# Patient Record
Sex: FEMALE | Race: WHITE | Employment: FULL TIME | URBAN - NONMETROPOLITAN AREA
[De-identification: names, ages, dates, MRNs, and addresses within clinical notes are randomized per-mention and may not be internally consistent; named-entity substitution may affect disease eponyms.]

---

## 2023-09-26 NOTE — PROGRESS NOTES
[facility] initial consultation note    Emma Neff                                                Chief complaint: Emma Neff is a 60 y. o.oldfemale came for further evaluation regarding her sleep apnea with referral from LILIANA Rod - CNP. Cachil DeHe:    Sleep/Wake schedule:  Usual time to go to bed during the work/regular day of week: 9 PM  Usual time to wake up during the work//regular day of week : 5:00Am  Over the weekends her sleep schedule:  [x]phase delayed. She feels the same on the weekends despite sleeping long time. She usually falls a sleep in less than: ~30minutes  She takes naps: Yes. Number of naps per week:  2  During each nap she spends a total of:  ~20 minutes  The naps were reported as refreshing: NO    Sleep Hygiene:    Is the temperature and evironment in her bed room is acceptable to her: Yes. She watches Television in her bed room: Yes. She read books, study, pay bills etc in the bed: No.  Frequency She wake up during night/sleep: 2 to 3  Majority of nocturnal awakenings are for urination: Yes. Difficulty in falling back to sleep after nocturnal awakenings: No    Do you drink coffee: No.   Do you drink caffeinated beverages i.e sodas: No.    Do you drink tea: She drinks 1 glass of decaffeinated tea once a week  Do you drink alcoholic beverages: No.      History of recreational drug use: No.     Social History     Tobacco Use    Smoking status: Never    Smokeless tobacco: Never   Substance Use Topics    Alcohol use: Not Currently    Drug use: Never       Sleep apnea symptoms:    Shewas diagnosed with sleep apnea in the past.  Please see diagnostic data section for details. History of headaches in the morning:No.  History of dry mouth in the morning: Yes.   History of palpitations during night time/nocturnal awakenings: No  History of sweating during night time/nocturnal awakenings:

## 2023-10-26 ENCOUNTER — OFFICE VISIT (OUTPATIENT)
Dept: PULMONOLOGY | Age: 58
End: 2023-10-26
Payer: COMMERCIAL

## 2023-10-26 VITALS
WEIGHT: 293 LBS | BODY MASS INDEX: 41.02 KG/M2 | TEMPERATURE: 97.5 F | DIASTOLIC BLOOD PRESSURE: 70 MMHG | HEART RATE: 53 BPM | SYSTOLIC BLOOD PRESSURE: 118 MMHG | HEIGHT: 71 IN | OXYGEN SATURATION: 98 %

## 2023-10-26 DIAGNOSIS — I48.20 CHRONIC A-FIB (HCC): ICD-10-CM

## 2023-10-26 DIAGNOSIS — G47.10 HYPERSOMNIA: ICD-10-CM

## 2023-10-26 DIAGNOSIS — G47.33 OSA (OBSTRUCTIVE SLEEP APNEA): Primary | ICD-10-CM

## 2023-10-26 PROCEDURE — 99204 OFFICE O/P NEW MOD 45 MIN: CPT | Performed by: INTERNAL MEDICINE

## 2023-10-26 RX ORDER — FUROSEMIDE 20 MG/1
20 TABLET ORAL PRN
COMMUNITY

## 2023-10-26 RX ORDER — LOSARTAN POTASSIUM 25 MG/1
25 TABLET ORAL DAILY
COMMUNITY

## 2023-10-26 RX ORDER — METOPROLOL SUCCINATE 50 MG/1
50 TABLET, EXTENDED RELEASE ORAL DAILY
COMMUNITY
End: 2023-10-26

## 2023-10-26 RX ORDER — LIOTHYRONINE SODIUM 5 UG/1
5 TABLET ORAL DAILY
COMMUNITY

## 2023-10-26 RX ORDER — FLECAINIDE ACETATE 50 MG/1
50 TABLET ORAL 2 TIMES DAILY
COMMUNITY

## 2023-10-26 RX ORDER — LEVOTHYROXINE SODIUM 0.1 MG/1
100 TABLET ORAL DAILY
COMMUNITY

## 2023-10-26 RX ORDER — MAGNESIUM 30 MG
250 TABLET ORAL DAILY
COMMUNITY

## 2023-10-26 NOTE — PATIENT INSTRUCTIONS
Recommendations/Plan:  -I had a discussion with patient regarding avialable treatment options for her sleep disorder breathing including but not limited to CPAP titration in the sleep lab Vs.Dental appliance placement with referral to a local dentist Vs other available surgical options including Uvulopalatopharyngoplasty, maxillomandibular ostomy, Inspire device placement and tracheostomy as last option. At the end of discussion, she decided to go for the CPAP titration. She is currently waiting for in lab CPAP titration study at [facility] on 8 November 2023. She was advised to keep her scheduled appointment without fail.  -she advised to keep good compliance with recommended positive airway pressure therapy to get optimal results and clinical improvement.  -Follow with my clinic in 6 to 8 weeks after starting on positive airway pressure therapy  for clinical reevaluation with review of download. Sheadvised to bring her CPAP/BiPAP/AutoSV machine or smart memory card from machine for download review.  -She was advised to call [employer] regarding supplies if needed.  -She was advised to continue to practice good sleep hygiene techniques.   -She was advised to loose weight by controlling diet and doing exercise once cleared by her cardiologist.  -She was instructed to not to drive any motor vehicles or operate heavy equipment if she feels sleepy. -She was advised to keep her scheduled follow-up appointment with Dr. Enzo Gracia MD regarding management of her chronic atrial fibrillation.  -She call my office for earlier appointment if needed for worsening of sleep symptoms.  -Neyda Loera educated about my impression and plan. She verbalizes understanding.

## 2023-10-26 NOTE — PROGRESS NOTES
Chief Complaint: New sleep consult    Mallampati airway Class:4  Neck Circumference:. 15.5Inches    [place] sleepiness score 10/26/23:   Sleep apnea quality of life questionnaire:.

## 2023-12-19 NOTE — PROGRESS NOTES
[place] [facility]  Sleep Medicine Clinic Follow up note    Kamila Godinez                                             Chief complaint and [place] IRA: Kamila Godinez is a 58 y.o.oldfemale came for follow up regarding her sleep apnea. She is currently using her positive airway pressure device with BiPAP pressure of 19/15 cm H20. She denies any problems with machine or mask. She is sleeping well at night with out difficulty.She denies any daytime sleepiness.       Review of Systems:   General/Constitutional: she gained/lost {NUMBERS 0-20:70352} lbs of weight from the last visit with normal appetite. No fever or chills.  HENT: Negative.   Eyes: Negative.  Upper respiratory tract: No nasal stuffiness or post nasal drip.  Lower respiratory tract/ lungs: No cough or sputum production. No hemoptysis.  Cardiovascular: No palpitations or chest pain.  Gastrointestinal: No nausea or vomiting.  Neurological: No focal neurologiacal weakness.  Extremities: No edema.  Musculoskeletal: No complaints.  Genitourinary: No complaints.  Hematological: Negative.   Psychiatric/Behavioral: Negative.   Skin: No itching.        Past Medical History:   Diagnosis Date    Atrial fibrillation (HCC)     CHF (congestive heart failure) (HCC)     Hypothyroidism        Past Surgical History:   Procedure Laterality Date    BREAST LUMPECTOMY      2010     SECTION      TUBAL LIGATION         Social History     Tobacco Use    Smoking status: Never    Smokeless tobacco: Never   Substance Use Topics    Alcohol use: Not Currently    Drug use: Never       No Known Allergies    Current Outpatient Medications   Medication Sig Dispense Refill    apixaban (ELIQUIS) 5 MG TABS tablet Take by mouth 2 times daily      GLUCOSAMINE SULFATE PO Take by mouth      levothyroxine (SYNTHROID) 100 MCG tablet Take 1 tablet by mouth Daily      magnesium 30 MG tablet Take 250 mg by mouth daily      VALACYCLOVIR HCL PO Take 200 mg by

## 2024-01-17 ENCOUNTER — OFFICE VISIT (OUTPATIENT)
Dept: PULMONOLOGY | Age: 59
End: 2024-01-17
Payer: COMMERCIAL

## 2024-01-17 VITALS
WEIGHT: 293 LBS | TEMPERATURE: 97.6 F | SYSTOLIC BLOOD PRESSURE: 120 MMHG | HEART RATE: 45 BPM | HEIGHT: 71 IN | BODY MASS INDEX: 41.02 KG/M2 | OXYGEN SATURATION: 97 % | DIASTOLIC BLOOD PRESSURE: 72 MMHG

## 2024-01-17 DIAGNOSIS — E66.01 CLASS 3 SEVERE OBESITY DUE TO EXCESS CALORIES WITH SERIOUS COMORBIDITY AND BODY MASS INDEX (BMI) OF 40.0 TO 44.9 IN ADULT (HCC): ICD-10-CM

## 2024-01-17 DIAGNOSIS — G47.33 OSA TREATED WITH BIPAP: Primary | ICD-10-CM

## 2024-01-17 DIAGNOSIS — I48.20 CHRONIC A-FIB (HCC): ICD-10-CM

## 2024-01-17 PROCEDURE — 99214 OFFICE O/P EST MOD 30 MIN: CPT | Performed by: NURSE PRACTITIONER

## 2024-01-17 NOTE — PROGRESS NOTES
[place] [facility] Sleep Medicine      Kamila Godinez         [id number]  1/17/2024   Chief Complaint   Patient presents with    Follow-up     3 month DELISA follow up with [facility] download, titration 11/8/23.        Pt of Dr. Dhaliwal    PAP Download:   Original or initial AHI: 18.8     Date of initial study: 8/9/23      Compliant  100%     Noncompliant 0%     PAP Type Spont Level  IPAP [id number] EPA [id number]   Avg Hrs/Day 7 hours 18 minutes  AHI: 6.3   Leaks : 95 th percentile: 7.4   Recorded compliance dates 12/17/23-1/15/24   Machine/Mfg:   [x] ResMed    [] Respironics/Dreamstation   Interface:   [] Nasal    [x] Nasal pillows   [] FFM      Provider:      [] -QUINTON     [x]Isaiah     [] Cynthia    [] Roxann    [] Schwietermans               [] [employer]      [] Adaptive    [] [place]:      [] Other    Neck Size: 15.5 inches  Mallampati 4  ESS:  2  SAQLI: 80    Here is a scan of the most recent download:                  Presentation:   [facility]  Since the last visit, Kamila completed in lab titration on 11/08/2023 , now on BipAP 19/14 . Using BiPAP with good compliance however reports difficulty with use.   C/o air leaks waking her up, feels mask is not comfortable. Feels like too much pressure.   Did have mask fitting 2 weeks ago with DME, they are going to be sending her nasal pillows, currently using FFM  Does feel benefit with use, falls asleep easier and feels more rested.         Progress History:   Since last visit any new medical issues? No  Any trouble with Machine No  Any new sleep medicines? no  Trouble Falling Asleep No  Trouble Staying Asleep No  Snoring no    Equipment issues:  The pressure is somewhat  acceptable, the mask is unacceptable     Review of Systems -   Review of Systems   Psychiatric/Behavioral:  Positive for sleep disturbance.    All other systems reviewed and are negative.       Physical

## 2024-04-17 ENCOUNTER — OFFICE VISIT (OUTPATIENT)
Dept: PULMONOLOGY | Age: 59
End: 2024-04-17
Payer: COMMERCIAL

## 2024-04-17 VITALS
OXYGEN SATURATION: 98 % | TEMPERATURE: 97.3 F | WEIGHT: 293 LBS | DIASTOLIC BLOOD PRESSURE: 70 MMHG | HEIGHT: 71 IN | HEART RATE: 52 BPM | BODY MASS INDEX: 41.02 KG/M2 | SYSTOLIC BLOOD PRESSURE: 110 MMHG

## 2024-04-17 DIAGNOSIS — E66.01 CLASS 3 SEVERE OBESITY DUE TO EXCESS CALORIES WITH SERIOUS COMORBIDITY AND BODY MASS INDEX (BMI) OF 40.0 TO 44.9 IN ADULT (HCC): ICD-10-CM

## 2024-04-17 DIAGNOSIS — G47.33 OSA TREATED WITH BIPAP: Primary | ICD-10-CM

## 2024-04-17 DIAGNOSIS — Z78.9 DIFFICULTY WITH BIPAP USE: ICD-10-CM

## 2024-04-17 PROCEDURE — 99214 OFFICE O/P EST MOD 30 MIN: CPT | Performed by: NURSE PRACTITIONER

## 2024-04-17 NOTE — PROGRESS NOTES
[place] [facility] and Sleep Medicine      Kamila Godinez         [id number]  4/17/2024   Chief Complaint   Patient presents with    Follow-up     3 month DELISA follow up with [facility] download.         Pt of Dr. Dhaliwal    PAP Download:   Original or initial AHI: 18.8     Date of initial study: 8/9/23      Compliant  100%     Noncompliant 0%     PAP Type Spont Level  IPAP [id number] EPAP [id number]   Avg Hrs/Day 7 hours 24 minutes  AHI: 2.1   Leaks : 95 th percentile: 37.4   Recorded compliance dates 3/17/24-4/15/24   Machine/Mfg:   [x] ResMed    [] Respironics/Dreamstation   Interface:   [] Nasal    [x] Nasal pillows   [] FFM      Provider:      [] -QUINTON     [x]Isaiah     [] Cynthia    [] [employer]    [] Schwietermans               [] [employer]      [] Adaptive    [] [place]:      [] Other    Neck Size: 15.5 inches  Mallampati 4  ESS:  5  SAQLI: 61    Here is a scan of the most recent download:                  Presentation:   [facility]  Since the last visit, Kamila is struggling with mask fit.  Has tried a few different masks. Is a restless sleeper so has been harder to get good seal . Currently using nasal pillows with neck collar to keep mouth from opening.   She is feeling better since starting on BIPAP therapy       Progress History:   Since last visit any new medical issues? No  Any trouble with Machine No  Any new sleep medicines? no  Trouble Falling Asleep No  Trouble Staying Asleep No  Snoring no    Equipment issues:  The pressure is somewhat  acceptable, the mask is somewhat acceptable   Does complain of some abdominal bloating      Review of Systems -   Review of Systems   All other systems reviewed and are negative.       Physical Exam:    BMI:  Body mass index is 43.88 kg/m².    Wt Readings from Last 3 Encounters:   04/17/24 (!) 140.7 kg (310 lb 3.2 oz)   01/17/24 (!) 138.8 kg (306 lb)   10/26/23 133.8 kg (295 lb)

## 2024-06-12 NOTE — PROGRESS NOTES
[place] [facility] Pul[facility], Critical Care and Sleep Medicine      Kamila Godinez         [id number]  6/13/2024   Chief Complaint   Patient presents with    Follow-up     2mo DELISA f/u w/[facility] download after pressure change.  Pt had been on 17/12 cmH2O and was changed to 19/14 cmH2O.  Here to review progress.  Notes the machine makes so much noise and keeps her and her  awake.  Was set up on current machine 11/20/23.  Advised her to take her machine to [facility] as it is still under warranty.        Pt of Dr. Dhaliwal     PAP Download:   Original or initial AHI: 18.8     Date of initial study: 8/9/2023      Compliant  97%     Noncompliant 3 %     PAP Type BiPAP    Level  17/12 cmH2O   Avg Hrs/Day 6hrs 32mins  AHI: 1.6   Leaks : 95 th percentile: 37.7   Recorded compliance dates , 5/12/24  to 6/10/24   Machine/Mfg:   [x] ResMed    [] Respironics/Dreamstation   Interface:   [] Nasal    [x] Nasal pillows   [] FFM      Provider:      [] SR-HME     [x][facility]     [] [employer]    [] [employer]    [] [employer]ans               [] [employer]      [] Adaptive    [] [place]:      [] Other    Neck Size: 15.5 inches  Mallampati 4  ESS:  6  SAQLI: 77    Here is a scan of the most recent download:                  Presentation:   [facility]  Since the last visit, Kamila's pressure was lowered due to complaints of air leaks and difficulty tolerating the pressure.  She is feeling much better.  She feels that she is getting a better seal even though the download not showing any improvement in the leaks.  Her biggest complaint today is that her machine is making a loud noise when she is breathing.  She feels that everything is hooked up appropriately the loudness of her machine is keeping her awake.  She will often wake up after 4 hours to use and take the machine off due to the loud noise      Equipment issues:  The pressure is  acceptable, the mask is acceptable

## 2024-06-13 ENCOUNTER — OFFICE VISIT (OUTPATIENT)
Dept: PULMONOLOGY | Age: 59
End: 2024-06-13
Payer: COMMERCIAL

## 2024-06-13 VITALS
WEIGHT: 293 LBS | SYSTOLIC BLOOD PRESSURE: 124 MMHG | HEIGHT: 71 IN | OXYGEN SATURATION: 99 % | DIASTOLIC BLOOD PRESSURE: 76 MMHG | BODY MASS INDEX: 41.02 KG/M2 | HEART RATE: 47 BPM | TEMPERATURE: 97.8 F

## 2024-06-13 DIAGNOSIS — G47.33 OSA TREATED WITH BIPAP: Primary | ICD-10-CM

## 2024-06-13 DIAGNOSIS — E66.01 CLASS 3 SEVERE OBESITY DUE TO EXCESS CALORIES WITH SERIOUS COMORBIDITY AND BODY MASS INDEX (BMI) OF 40.0 TO 44.9 IN ADULT (HCC): ICD-10-CM

## 2024-06-13 PROCEDURE — 99214 OFFICE O/P EST MOD 30 MIN: CPT | Performed by: NURSE PRACTITIONER

## 2024-06-13 ASSESSMENT — ENCOUNTER SYMPTOMS
ALLERGIC/IMMUNOLOGIC NEGATIVE: 1
RESPIRATORY NEGATIVE: 1